# Patient Record
Sex: FEMALE | Race: WHITE | NOT HISPANIC OR LATINO
[De-identification: names, ages, dates, MRNs, and addresses within clinical notes are randomized per-mention and may not be internally consistent; named-entity substitution may affect disease eponyms.]

---

## 2020-01-10 ENCOUNTER — APPOINTMENT (OUTPATIENT)
Dept: COLORECTAL SURGERY | Facility: CLINIC | Age: 40
End: 2020-01-10
Payer: COMMERCIAL

## 2020-01-10 VITALS
DIASTOLIC BLOOD PRESSURE: 73 MMHG | HEART RATE: 70 BPM | BODY MASS INDEX: 23.03 KG/M2 | WEIGHT: 122 LBS | HEIGHT: 61 IN | SYSTOLIC BLOOD PRESSURE: 109 MMHG | RESPIRATION RATE: 14 BRPM

## 2020-01-10 PROCEDURE — 46940 TREATMENT OF ANAL FISSURE: CPT

## 2020-01-10 PROCEDURE — 99245 OFF/OP CONSLTJ NEW/EST HI 55: CPT | Mod: 25

## 2020-01-11 NOTE — ASSESSMENT
[FreeTextEntry1] : 39-year-old female with chronic anal fissure and hemorrhoids. Recommend nitroglycerin ointment b.i.d.recommend high fiber diet, metamucil daily, stool softners, sitzs baths, hydrocortisone cream and suppositories prn if symptoms didn't respond and surgery with fissurectomy sphincterotomy hemorrhoidectomy

## 2020-01-11 NOTE — HISTORY OF PRESENT ILLNESS
[FreeTextEntry1] : 39 yr old Female with complaints of anorectal pain and bleeding and has a past history of anal fissure and hemorrhoids symptoms have been worsening over the last several months

## 2020-01-11 NOTE — PHYSICAL EXAM
[Abdomen Masses] : No abdominal masses [Abdomen Tenderness] : ~T No ~M abdominal tenderness [Tender] : nontender [Posterior] : posteriorly [Excoriation] : no perianal excoriation [Manually Reducible] : a manually reducible (grade III) [Tender, Swollen] : tender, swollen [JVD] : no jugular venous distention  [Normal Breath Sounds] : Normal breath sounds [Normal Heart Sounds] : normal heart sounds [Normal Rate and Rhythm] : normal rate and rhythm [No Rash or Lesion] : No rash or lesion [Alert] : alert [Oriented to Person] : oriented to person [Oriented to Place] : oriented to place [Oriented to Time] : oriented to time [Calm] : calm [de-identified] : Looks well in no distress, of stated age. [de-identified] : pupils equal reactive to light normocephalic atraumatic. [de-identified] : moves all 4 extremities appropriately with 5 over 5 strength

## 2020-01-13 ENCOUNTER — APPOINTMENT (OUTPATIENT)
Dept: COLORECTAL SURGERY | Facility: CLINIC | Age: 40
End: 2020-01-13

## 2020-02-04 ENCOUNTER — OUTPATIENT (OUTPATIENT)
Dept: OUTPATIENT SERVICES | Facility: HOSPITAL | Age: 40
LOS: 1 days | End: 2020-02-04
Payer: COMMERCIAL

## 2020-02-04 VITALS
DIASTOLIC BLOOD PRESSURE: 74 MMHG | SYSTOLIC BLOOD PRESSURE: 118 MMHG | HEIGHT: 62 IN | WEIGHT: 123.02 LBS | TEMPERATURE: 98 F | OXYGEN SATURATION: 100 % | RESPIRATION RATE: 18 BRPM | HEART RATE: 64 BPM

## 2020-02-04 DIAGNOSIS — Z01.818 ENCOUNTER FOR OTHER PREPROCEDURAL EXAMINATION: ICD-10-CM

## 2020-02-04 DIAGNOSIS — K60.2 ANAL FISSURE, UNSPECIFIED: ICD-10-CM

## 2020-02-04 DIAGNOSIS — Z86.69 PERSONAL HISTORY OF OTHER DISEASES OF THE NERVOUS SYSTEM AND SENSE ORGANS: Chronic | ICD-10-CM

## 2020-02-04 LAB
ANION GAP SERPL CALC-SCNC: 3 MMOL/L — LOW (ref 5–17)
APTT BLD: 35.1 SEC — SIGNIFICANT CHANGE UP (ref 27.5–36.3)
BASOPHILS # BLD AUTO: 0.05 K/UL — SIGNIFICANT CHANGE UP (ref 0–0.2)
BASOPHILS NFR BLD AUTO: 0.7 % — SIGNIFICANT CHANGE UP (ref 0–2)
BUN SERPL-MCNC: 18 MG/DL — SIGNIFICANT CHANGE UP (ref 7–23)
CALCIUM SERPL-MCNC: 9 MG/DL — SIGNIFICANT CHANGE UP (ref 8.5–10.1)
CHLORIDE SERPL-SCNC: 111 MMOL/L — HIGH (ref 96–108)
CO2 SERPL-SCNC: 27 MMOL/L — SIGNIFICANT CHANGE UP (ref 22–31)
CREAT SERPL-MCNC: 0.76 MG/DL — SIGNIFICANT CHANGE UP (ref 0.5–1.3)
EOSINOPHIL # BLD AUTO: 0.12 K/UL — SIGNIFICANT CHANGE UP (ref 0–0.5)
EOSINOPHIL NFR BLD AUTO: 1.7 % — SIGNIFICANT CHANGE UP (ref 0–6)
GLUCOSE SERPL-MCNC: 75 MG/DL — SIGNIFICANT CHANGE UP (ref 70–99)
HCT VFR BLD CALC: 37.2 % — SIGNIFICANT CHANGE UP (ref 34.5–45)
HGB BLD-MCNC: 11.9 G/DL — SIGNIFICANT CHANGE UP (ref 11.5–15.5)
IMM GRANULOCYTES NFR BLD AUTO: 0.3 % — SIGNIFICANT CHANGE UP (ref 0–1.5)
INR BLD: 1.06 RATIO — SIGNIFICANT CHANGE UP (ref 0.88–1.16)
LYMPHOCYTES # BLD AUTO: 2.39 K/UL — SIGNIFICANT CHANGE UP (ref 1–3.3)
LYMPHOCYTES # BLD AUTO: 33.5 % — SIGNIFICANT CHANGE UP (ref 13–44)
MCHC RBC-ENTMCNC: 30.4 PG — SIGNIFICANT CHANGE UP (ref 27–34)
MCHC RBC-ENTMCNC: 32 GM/DL — SIGNIFICANT CHANGE UP (ref 32–36)
MCV RBC AUTO: 94.9 FL — SIGNIFICANT CHANGE UP (ref 80–100)
MONOCYTES # BLD AUTO: 0.48 K/UL — SIGNIFICANT CHANGE UP (ref 0–0.9)
MONOCYTES NFR BLD AUTO: 6.7 % — SIGNIFICANT CHANGE UP (ref 2–14)
NEUTROPHILS # BLD AUTO: 4.07 K/UL — SIGNIFICANT CHANGE UP (ref 1.8–7.4)
NEUTROPHILS NFR BLD AUTO: 57.1 % — SIGNIFICANT CHANGE UP (ref 43–77)
PLATELET # BLD AUTO: 295 K/UL — SIGNIFICANT CHANGE UP (ref 150–400)
POTASSIUM SERPL-MCNC: 4.3 MMOL/L — SIGNIFICANT CHANGE UP (ref 3.5–5.3)
POTASSIUM SERPL-SCNC: 4.3 MMOL/L — SIGNIFICANT CHANGE UP (ref 3.5–5.3)
PROTHROM AB SERPL-ACNC: 11.8 SEC — SIGNIFICANT CHANGE UP (ref 10–12.9)
RBC # BLD: 3.92 M/UL — SIGNIFICANT CHANGE UP (ref 3.8–5.2)
RBC # FLD: 14 % — SIGNIFICANT CHANGE UP (ref 10.3–14.5)
SODIUM SERPL-SCNC: 141 MMOL/L — SIGNIFICANT CHANGE UP (ref 135–145)
WBC # BLD: 7.13 K/UL — SIGNIFICANT CHANGE UP (ref 3.8–10.5)
WBC # FLD AUTO: 7.13 K/UL — SIGNIFICANT CHANGE UP (ref 3.8–10.5)

## 2020-02-04 PROCEDURE — 85730 THROMBOPLASTIN TIME PARTIAL: CPT

## 2020-02-04 PROCEDURE — 36415 COLL VENOUS BLD VENIPUNCTURE: CPT

## 2020-02-04 PROCEDURE — 85025 COMPLETE CBC W/AUTO DIFF WBC: CPT

## 2020-02-04 PROCEDURE — 85610 PROTHROMBIN TIME: CPT

## 2020-02-04 PROCEDURE — 93005 ELECTROCARDIOGRAM TRACING: CPT

## 2020-02-04 PROCEDURE — 71046 X-RAY EXAM CHEST 2 VIEWS: CPT

## 2020-02-04 PROCEDURE — 80048 BASIC METABOLIC PNL TOTAL CA: CPT

## 2020-02-04 PROCEDURE — G0463: CPT | Mod: 25

## 2020-02-04 PROCEDURE — 93010 ELECTROCARDIOGRAM REPORT: CPT

## 2020-02-04 PROCEDURE — 71046 X-RAY EXAM CHEST 2 VIEWS: CPT | Mod: 26

## 2020-02-04 NOTE — H&P PST ADULT - TEACHING/LEARNING LEARNING PREFERENCES
verbal instruction/individual instruction/pictorial/skill demonstration/group instruction/audio/computer/internet/video/written material

## 2020-02-04 NOTE — H&P PST ADULT - HISTORY OF PRESENT ILLNESS
39 y.o female presents for PST with hx of hem 39 y.o female presents for PST with hx of hemorrhoids. Patient states ever since her last pregnancy 16yrs ago she has had hemorrhoids. Her hx also significant for constipation. She has treated her symptoms conservatively over the years. She states her hemorrhoids have worsened, bleeding, painful. She has developed a fissure.  Patient followed with surgeon and opting for surgical intervention. Scheduled for Hemorrhoidectomy, Fissurectomy, Sphincterotomy

## 2020-02-04 NOTE — H&P PST ADULT - ASSESSMENT
39 y.o female scheduled for 39 y.o female scheduled for Hemorrhoidectomy, Fissurectomy, Sphincterotomy   Plan  1. Stop all NSAIDS, herbal supplements and vitamins for 7 days.  2. NPO at midnight.  3. Take the following medications Lexapro with small sips of water on the morning of your procedure/surgery.  4. Labs, EKG, CXR  as per surgeon  5. UCG STAT on admit to [facility]

## 2020-02-04 NOTE — H&P PST ADULT - NSICDXPASTMEDICALHX_GEN_ALL_CORE_FT
PAST MEDICAL HISTORY:  Anxiety     H/O chronic fatigue syndrome     H/O hemorrhoids     History of anal fissures     History of Chiari malformation     Lyme disease 2009- treated as a result chronic fatigue syndrome

## 2020-02-04 NOTE — H&P PST ADULT - NEUROLOGICAL COMMENTS
hx Chiari Malformation surgery 2011- no longer follows with neuro Dr [facility] at [facility]

## 2020-02-05 DIAGNOSIS — K60.2 ANAL FISSURE, UNSPECIFIED: ICD-10-CM

## 2020-02-05 DIAGNOSIS — Z01.818 ENCOUNTER FOR OTHER PREPROCEDURAL EXAMINATION: ICD-10-CM

## 2020-02-07 RX ORDER — SODIUM CHLORIDE 9 MG/ML
3 INJECTION INTRAMUSCULAR; INTRAVENOUS; SUBCUTANEOUS EVERY 8 HOURS
Refills: 0 | Status: DISCONTINUED | OUTPATIENT
Start: 2020-02-13 | End: 2020-02-13

## 2020-02-12 RX ORDER — FENTANYL CITRATE 50 UG/ML
50 INJECTION INTRAVENOUS
Refills: 0 | Status: DISCONTINUED | OUTPATIENT
Start: 2020-02-13 | End: 2020-02-13

## 2020-02-12 RX ORDER — ONDANSETRON 8 MG/1
4 TABLET, FILM COATED ORAL ONCE
Refills: 0 | Status: DISCONTINUED | OUTPATIENT
Start: 2020-02-13 | End: 2020-02-13

## 2020-02-12 RX ORDER — OXYCODONE HYDROCHLORIDE 5 MG/1
5 TABLET ORAL ONCE
Refills: 0 | Status: DISCONTINUED | OUTPATIENT
Start: 2020-02-13 | End: 2020-02-13

## 2020-02-12 RX ORDER — SODIUM CHLORIDE 9 MG/ML
1000 INJECTION, SOLUTION INTRAVENOUS
Refills: 0 | Status: DISCONTINUED | OUTPATIENT
Start: 2020-02-13 | End: 2020-02-13

## 2020-02-13 ENCOUNTER — APPOINTMENT (OUTPATIENT)
Dept: COLORECTAL SURGERY | Facility: HOSPITAL | Age: 40
End: 2020-02-13
Payer: COMMERCIAL

## 2020-02-13 ENCOUNTER — OUTPATIENT (OUTPATIENT)
Dept: INPATIENT UNIT | Facility: HOSPITAL | Age: 40
LOS: 1 days | Discharge: HOME | End: 2020-02-13
Payer: COMMERCIAL

## 2020-02-13 ENCOUNTER — RESULT REVIEW (OUTPATIENT)
Age: 40
End: 2020-02-13

## 2020-02-13 VITALS
TEMPERATURE: 98 F | HEIGHT: 62 IN | WEIGHT: 123.02 LBS | OXYGEN SATURATION: 100 % | DIASTOLIC BLOOD PRESSURE: 63 MMHG | SYSTOLIC BLOOD PRESSURE: 104 MMHG | RESPIRATION RATE: 16 BRPM | HEART RATE: 71 BPM

## 2020-02-13 VITALS
TEMPERATURE: 98 F | RESPIRATION RATE: 14 BRPM | SYSTOLIC BLOOD PRESSURE: 100 MMHG | HEART RATE: 82 BPM | DIASTOLIC BLOOD PRESSURE: 61 MMHG | OXYGEN SATURATION: 100 %

## 2020-02-13 DIAGNOSIS — K64.4 RESIDUAL HEMORRHOIDAL SKIN TAGS: ICD-10-CM

## 2020-02-13 DIAGNOSIS — Z86.69 PERSONAL HISTORY OF OTHER DISEASES OF THE NERVOUS SYSTEM AND SENSE ORGANS: Chronic | ICD-10-CM

## 2020-02-13 DIAGNOSIS — K60.2 ANAL FISSURE, UNSPECIFIED: ICD-10-CM

## 2020-02-13 LAB — HCG UR QL: NEGATIVE — SIGNIFICANT CHANGE UP

## 2020-02-13 PROCEDURE — 46200 REMOVAL OF ANAL FISSURE: CPT | Mod: 59

## 2020-02-13 PROCEDURE — C9290: CPT

## 2020-02-13 PROCEDURE — 46260 REMOVE IN/EX HEM GROUPS 2+: CPT

## 2020-02-13 PROCEDURE — 81025 URINE PREGNANCY TEST: CPT

## 2020-02-13 PROCEDURE — 88304 TISSUE EXAM BY PATHOLOGIST: CPT | Mod: 26

## 2020-02-13 PROCEDURE — 88304 TISSUE EXAM BY PATHOLOGIST: CPT

## 2020-02-13 RX ORDER — OXYCODONE AND ACETAMINOPHEN 5; 325 MG/1; MG/1
1 TABLET ORAL EVERY 4 HOURS
Refills: 0 | Status: DISCONTINUED | OUTPATIENT
Start: 2020-02-13 | End: 2020-02-13

## 2020-02-13 RX ORDER — ESCITALOPRAM OXALATE 10 MG/1
1 TABLET, FILM COATED ORAL
Qty: 0 | Refills: 0 | DISCHARGE

## 2020-02-13 RX ORDER — ONDANSETRON 8 MG/1
4 TABLET, FILM COATED ORAL EVERY 6 HOURS
Refills: 0 | Status: DISCONTINUED | OUTPATIENT
Start: 2020-02-13 | End: 2020-02-13

## 2020-02-13 RX ORDER — KETOROLAC TROMETHAMINE 30 MG/ML
1 SYRINGE (ML) INJECTION
Qty: 20 | Refills: 0
Start: 2020-02-13 | End: 2020-02-17

## 2020-02-13 RX ORDER — CHOLECALCIFEROL (VITAMIN D3) 125 MCG
0 CAPSULE ORAL
Qty: 0 | Refills: 0 | DISCHARGE

## 2020-02-13 NOTE — ASU DISCHARGE PLAN (ADULT/PEDIATRIC) - CARE PROVIDER_API CALL
Tenzin Moulton)  ColonRectal Surgery; Surgery  [address] [place] [address]  Phone: [phone]  Fax: [phone]  Follow Up Time:

## 2020-02-13 NOTE — ASU PATIENT PROFILE, ADULT - PMH
Anxiety    H/O chronic fatigue syndrome    H/O hemorrhoids    History of anal fissures    History of Chiari malformation    Lyme disease  2009- treated as a result chronic fatigue syndrome

## 2020-02-13 NOTE — BRIEF OPERATIVE NOTE - NSICDXBRIEFPREOP_GEN_ALL_CORE_FT
PRE-OP DIAGNOSIS:  Hemorrhoids 13-Feb-2020 08:04:18  Gabriele Mackey  Anal fissure 13-Feb-2020 08:04:03  Gabriele Mackey

## 2020-02-13 NOTE — BRIEF OPERATIVE NOTE - NSICDXBRIEFPROCEDURE_GEN_ALL_CORE_FT
PROCEDURES:  Hemorrhoidectomy, internal and external, with fissurectomy, 1 anal column 13-Feb-2020 08:03:55  Gabriele Mackey  Anal fissurectomy including sphincterotomy 13-Feb-2020 08:03:43  Gabriele Mackey

## 2020-02-13 NOTE — ASU DISCHARGE PLAN (ADULT/PEDIATRIC) - CALL YOUR DOCTOR IF YOU HAVE ANY OF THE FOLLOWING:
Pain not relieved by Medications/Excessive diarrhea/Nausea and vomiting that does not stop/Bleeding that does not stop

## 2020-02-14 PROBLEM — Z86.69 PERSONAL HISTORY OF OTHER DISEASES OF THE NERVOUS SYSTEM AND SENSE ORGANS: Chronic | Status: ACTIVE | Noted: 2020-02-04

## 2020-02-14 PROBLEM — A69.20 LYME DISEASE, UNSPECIFIED: Chronic | Status: ACTIVE | Noted: 2020-02-04

## 2020-02-14 PROBLEM — F41.9 ANXIETY DISORDER, UNSPECIFIED: Chronic | Status: ACTIVE | Noted: 2020-02-04

## 2020-02-14 PROBLEM — Z87.19 PERSONAL HISTORY OF OTHER DISEASES OF THE DIGESTIVE SYSTEM: Chronic | Status: ACTIVE | Noted: 2020-02-04

## 2020-02-14 PROBLEM — Z87.898 PERSONAL HISTORY OF OTHER SPECIFIED CONDITIONS: Chronic | Status: ACTIVE | Noted: 2020-02-04

## 2020-02-18 DIAGNOSIS — K64.8 OTHER HEMORRHOIDS: ICD-10-CM

## 2020-02-18 DIAGNOSIS — Z87.891 PERSONAL HISTORY OF NICOTINE DEPENDENCE: ICD-10-CM

## 2020-02-18 DIAGNOSIS — K64.4 RESIDUAL HEMORRHOIDAL SKIN TAGS: ICD-10-CM

## 2020-02-18 DIAGNOSIS — K60.1 CHRONIC ANAL FISSURE: ICD-10-CM

## 2020-02-18 DIAGNOSIS — Z88.5 ALLERGY STATUS TO NARCOTIC AGENT: ICD-10-CM

## 2020-02-18 DIAGNOSIS — F41.9 ANXIETY DISORDER, UNSPECIFIED: ICD-10-CM

## 2020-03-12 ENCOUNTER — APPOINTMENT (OUTPATIENT)
Dept: COLORECTAL SURGERY | Facility: CLINIC | Age: 40
End: 2020-03-12
Payer: COMMERCIAL

## 2020-03-12 VITALS
WEIGHT: 122 LBS | HEART RATE: 72 BPM | BODY MASS INDEX: 23.03 KG/M2 | RESPIRATION RATE: 14 BRPM | DIASTOLIC BLOOD PRESSURE: 72 MMHG | SYSTOLIC BLOOD PRESSURE: 104 MMHG | HEIGHT: 61 IN | TEMPERATURE: 97.9 F

## 2020-03-12 DIAGNOSIS — K60.2 ANAL FISSURE, UNSPECIFIED: ICD-10-CM

## 2020-03-12 PROCEDURE — 99024 POSTOP FOLLOW-UP VISIT: CPT

## 2020-03-12 NOTE — PHYSICAL EXAM
[Normal rectal exam] : exam was normal [Excoriation] : no perianal excoriation [Tender, Swollen] : nontender, non-swollen [Normal] : was normal [None] : there was no rectal mass  [de-identified] : Looks well in no distress, of stated age.

## 2020-03-12 NOTE — ASSESSMENT
[FreeTextEntry1] : 39-year-old female post hemorrhoidectomy fisure surgery who fell at work today and landed on her backside. There is no indication of any trauma to the surgical area she is healing nicely.

## 2020-03-12 NOTE — HISTORY OF PRESENT ILLNESS
[FreeTextEntry1] : 39-year-old female postop fissure and hemorrhoid surgery she unfortunately fell at work today and landed on her backside. Complains of shortness at the tailbone region. Otherwise no major complaints

## 2020-05-05 ENCOUNTER — APPOINTMENT (OUTPATIENT)
Dept: COLORECTAL SURGERY | Facility: CLINIC | Age: 40
End: 2020-05-05

## 2020-10-20 ENCOUNTER — APPOINTMENT (OUTPATIENT)
Dept: COLORECTAL SURGERY | Facility: CLINIC | Age: 40
End: 2020-10-20
Payer: COMMERCIAL

## 2020-10-20 DIAGNOSIS — K21.9 GASTRO-ESOPHAGEAL REFLUX DISEASE W/OUT ESOPHAGITIS: ICD-10-CM

## 2020-10-20 DIAGNOSIS — K64.9 UNSPECIFIED HEMORRHOIDS: ICD-10-CM

## 2020-10-20 DIAGNOSIS — R10.13 EPIGASTRIC PAIN: ICD-10-CM

## 2020-10-20 PROCEDURE — 99214 OFFICE O/P EST MOD 30 MIN: CPT | Mod: 25

## 2020-10-20 PROCEDURE — 46600 DIAGNOSTIC ANOSCOPY SPX: CPT

## 2020-10-21 PROBLEM — K21.9 GERD (GASTROESOPHAGEAL REFLUX DISEASE): Status: ACTIVE | Noted: 2020-10-21

## 2020-10-21 PROBLEM — R10.13 DYSPEPSIA: Status: ACTIVE | Noted: 2020-10-21

## 2020-10-21 NOTE — ASSESSMENT
[FreeTextEntry1] : 40-year-old female post hemorrhoidectomy fisure surgery who fell at work today and landed on her backside. There is no indication of any trauma to the surgical area she is healing nicely.

## 2020-10-21 NOTE — REVIEW OF SYSTEMS
[As Noted in HPI] : as noted in HPI [Abdominal Pain] : abdominal pain [Heartburn] : heartburn [Negative] : Heme/Lymph [FreeTextEntry7] : dyspepsia

## 2020-10-21 NOTE — HISTORY OF PRESENT ILLNESS
[FreeTextEntry1] : 40 year-old female with  hemorrhoids with new complaints of dyspepsia and GERD for several months, symptoms have been worsening

## 2020-10-21 NOTE — PHYSICAL EXAM
[Normal rectal exam] : exam was normal [Excoriation] : no perianal excoriation [Nonprolapsing] : a nonprolapsing (grade I) [Tender, Swollen] : tender, swollen [Normal] : was normal [None] : there was no rectal mass  [JVD] : no jugular venous distention  [Normal Breath Sounds] : Normal breath sounds [Normal Heart Sounds] : normal heart sounds [Normal Rate and Rhythm] : normal rate and rhythm [No Rash or Lesion] : No rash or lesion [Alert] : alert [Oriented to Person] : oriented to person [Oriented to Place] : oriented to place [Oriented to Time] : oriented to time [Calm] : calm [de-identified] : Looks well in no distress, of stated age.

## 2022-09-19 NOTE — ASU PATIENT PROFILE, ADULT - TEACHING/LEARNING LEARNING PREFERENCES
Render In Strict Bullet Format?: Yes Detail Level: Zone Continue Regimen: TAC 0.1 % cream: BID prn up to 2 weeks/month on any given area\\nCetaphil or CeraVe cream twice daily ongoing Plan: Involvement on intertriginous areas x years\\nImprovement with current regimen\\nPatient/mother decline therapy change at this time\\nRefills available at pharmacy\\n\\nRTC in 1 year for refills, sooner for flares/therapy change video/written material/skill demonstration/individual instruction/pictorial/audio/computer/internet/group instruction/verbal instruction

## 2023-04-04 ENCOUNTER — APPOINTMENT (OUTPATIENT)
Dept: COLORECTAL SURGERY | Facility: CLINIC | Age: 43
End: 2023-04-04
Payer: COMMERCIAL

## 2023-04-04 VITALS
RESPIRATION RATE: 14 BRPM | BODY MASS INDEX: 28.32 KG/M2 | HEART RATE: 76 BPM | OXYGEN SATURATION: 99 % | WEIGHT: 150 LBS | HEIGHT: 61 IN | SYSTOLIC BLOOD PRESSURE: 107 MMHG | TEMPERATURE: 97.6 F | DIASTOLIC BLOOD PRESSURE: 75 MMHG

## 2023-04-04 DIAGNOSIS — M79.18 MYALGIA, OTHER SITE: ICD-10-CM

## 2023-04-04 DIAGNOSIS — K56.1 INTUSSUSCEPTION: ICD-10-CM

## 2023-04-04 PROCEDURE — 46600 DIAGNOSTIC ANOSCOPY SPX: CPT

## 2023-04-04 PROCEDURE — 99214 OFFICE O/P EST MOD 30 MIN: CPT | Mod: 25

## 2023-04-04 RX ORDER — HYDROCORTISONE 100 MG/60ML
100 ENEMA RECTAL
Qty: 14 | Refills: 1 | Status: ACTIVE | COMMUNITY
Start: 2023-04-04 | End: 1900-01-01

## 2023-04-04 RX ORDER — DIAZEPAM 100 %
POWDER (GRAM) MISCELLANEOUS
Qty: 50 | Refills: 0 | Status: ACTIVE | COMMUNITY
Start: 2023-04-04 | End: 1900-01-01

## 2023-04-04 NOTE — HISTORY OF PRESENT ILLNESS
[FreeTextEntry1] : 40 year-old female with  hemorrhoids with new complaints of dyspepsia and GERD for several months, symptoms have been worsening\par \par 4/4/23 Tessy returns to the office for follow-up.  She recently developed significant constipation associated with Saxenda.  Secondary to this constipation, she strained to have a bowel movement with subsequent blowout.  Since that time, she feels constant pressure within her rectum as if she needs to defecate and she also is having significant pelvic pain that also radiates to her front.  Since the medication was discontinued, she does not have any issues passing stools whether they are somewhat hard for soft.  No reports of rectal bleeding.

## 2023-04-04 NOTE — PHYSICAL EXAM
[Normal rectal exam] : exam was normal [Anterior] : anteriorly [Posterior] : posteriorly [Excoriation] : no perianal excoriation [Reduce Spontaneously] : a spontaneously reducible (grade II) [Normal] : was normal [None] : there was no rectal mass  [JVD] : no jugular venous distention  [Normal Breath Sounds] : Normal breath sounds [Normal Heart Sounds] : normal heart sounds [Normal Rate and Rhythm] : normal rate and rhythm [No Rash or Lesion] : No rash or lesion [Alert] : alert [Oriented to Person] : oriented to person [Oriented to Place] : oriented to place [Oriented to Time] : oriented to time [Calm] : calm [de-identified] : Right posterior and left anterior asymptomatic anal fissures [de-identified] : Circumferential distal rectal mucosa inflammation [de-identified] : Looks well in no distress, of stated age.

## 2023-10-10 ENCOUNTER — APPOINTMENT (OUTPATIENT)
Dept: ORTHOPEDIC SURGERY | Facility: CLINIC | Age: 43
End: 2023-10-10
Payer: COMMERCIAL

## 2023-10-10 PROCEDURE — 28400 CLTX CALCANEAL FX W/O MNPJ: CPT | Mod: RT

## 2023-10-10 PROCEDURE — L4361: CPT | Mod: RT

## 2023-10-10 PROCEDURE — 28450 TX TARSAL B1 FX W/O MNPJ EA: CPT | Mod: LT

## 2023-10-10 PROCEDURE — 99203 OFFICE O/P NEW LOW 30 MIN: CPT | Mod: 25

## 2023-10-10 PROCEDURE — 27786 TREATMENT OF ANKLE FRACTURE: CPT | Mod: RT

## 2023-10-13 ENCOUNTER — RESULT REVIEW (OUTPATIENT)
Age: 43
End: 2023-10-13

## 2023-10-16 ENCOUNTER — APPOINTMENT (OUTPATIENT)
Dept: ORTHOPEDIC SURGERY | Facility: CLINIC | Age: 43
End: 2023-10-16
Payer: COMMERCIAL

## 2023-10-16 PROCEDURE — 99024 POSTOP FOLLOW-UP VISIT: CPT

## 2023-11-30 ENCOUNTER — APPOINTMENT (OUTPATIENT)
Dept: ORTHOPEDIC SURGERY | Facility: CLINIC | Age: 43
End: 2023-11-30
Payer: COMMERCIAL

## 2023-11-30 ENCOUNTER — TRANSCRIPTION ENCOUNTER (OUTPATIENT)
Age: 43
End: 2023-11-30

## 2023-11-30 DIAGNOSIS — S92.355A NONDISPLACED FRACTURE OF FIFTH METATARSAL BONE, LEFT FOOT, INITIAL ENCOUNTER FOR CLOSED FRACTURE: ICD-10-CM

## 2023-11-30 DIAGNOSIS — S90.32XA CONTUSION OF LEFT FOOT, INITIAL ENCOUNTER: ICD-10-CM

## 2023-11-30 DIAGNOSIS — S90.31XA CONTUSION OF RIGHT FOOT, INITIAL ENCOUNTER: ICD-10-CM

## 2023-11-30 DIAGNOSIS — S90.02XA CONTUSION OF LEFT ANKLE, INITIAL ENCOUNTER: ICD-10-CM

## 2023-11-30 DIAGNOSIS — S90.01XA CONTUSION OF RIGHT ANKLE, INITIAL ENCOUNTER: ICD-10-CM

## 2023-11-30 DIAGNOSIS — S92.021P: ICD-10-CM

## 2023-11-30 PROCEDURE — 73630 X-RAY EXAM OF FOOT: CPT | Mod: RT

## 2023-11-30 PROCEDURE — 99024 POSTOP FOLLOW-UP VISIT: CPT

## 2024-01-02 ENCOUNTER — APPOINTMENT (OUTPATIENT)
Dept: ORTHOPEDIC SURGERY | Facility: CLINIC | Age: 44
End: 2024-01-02
Payer: COMMERCIAL

## 2024-01-02 PROCEDURE — 99024 POSTOP FOLLOW-UP VISIT: CPT

## 2024-01-02 PROCEDURE — 73630 X-RAY EXAM OF FOOT: CPT | Mod: RT

## 2024-01-02 NOTE — PHYSICAL EXAM
[2+] : posterior tibialis pulse: 2+ [Normal] : saphenous nerve sensation normal [Moderate] : moderate swelling of medial foot [NL 30)] : inversion 30 degrees [NL (40)] : MTP joint DF 40 degrees [NL (20)] : MTP joint PF 20 degrees [5___] : Count includes the [facility] 5[unfilled]/5 [The fracture is in acceptable alignment. There is progression in healing seen] : The fracture is in acceptable alignment. There is progression in healing seen [FreeTextEntry9] : Lateral mall avulsion.  [de-identified] : Anterior process fracture Calcaneal  Fifth met fracture right foot,  [Right] : right foot and ankle [Mild] : mild diffused ankle swelling [5th] : 5th [4___] : plantar flexion 4[unfilled]/5 [] : patient ambulates without assistive device [de-identified] : 5th metatarsal diaphyseal fracture with continued visualization of fracture line less than 1cm gap

## 2024-01-02 NOTE — DISCUSSION/SUMMARY
[de-identified] : I reviewed patient's radiographs and discussed her condition and treatment options.  I advised starting bone stimulator to enhance healing at the fracture site.  In the interim, I advised discontinuing CAM boot but wearing hard sole shoe.  Follow up in 2 weeks to  bone stimulator.  In the interim, I advised taking calcium and vitamin D supplements (patient is early menopausal and vegetarian).  Patient voiced understanding and agreement with the plan.

## 2024-01-02 NOTE — HISTORY OF PRESENT ILLNESS
[de-identified] : Patient of Dr. Reaves.  Since last visit, patient states that she has been out of the cam boot and walking on her own for the past week. 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  She is concerned about persistent right ankle and foot pain.
complains of pain/discomfort

## 2024-01-16 ENCOUNTER — APPOINTMENT (OUTPATIENT)
Dept: ORTHOPEDIC SURGERY | Facility: CLINIC | Age: 44
End: 2024-01-16

## 2024-02-06 ENCOUNTER — APPOINTMENT (OUTPATIENT)
Dept: ORTHOPEDIC SURGERY | Facility: CLINIC | Age: 44
End: 2024-02-06
Payer: COMMERCIAL

## 2024-02-06 PROCEDURE — 99213 OFFICE O/P EST LOW 20 MIN: CPT

## 2024-02-06 PROCEDURE — 73630 X-RAY EXAM OF FOOT: CPT | Mod: RT

## 2024-02-06 NOTE — DISCUSSION/SUMMARY
[de-identified] : I reviewed patient's radiographs and discussed her condition and treatment options.  Despite bone stimulator denial, I advised starting PT and HEP.   Follow up in 4-6 weeks XRs.  Patient voiced understanding and agreement with the plan.

## 2024-02-06 NOTE — HISTORY OF PRESENT ILLNESS
[de-identified] : Since last visit, patient states that she is still having pain. Patient's insurance denied bone stimulator which is frustrating, given continued pain and slow healing.

## 2024-02-06 NOTE — PHYSICAL EXAM
[Mild] : mild swelling of lateral foot [5th] : 5th [NL (40)] : plantar flexion 40 degrees [NL 30)] : inversion 30 degrees [NL (20)] : eversion 20 degrees [4___] : plantar flexion 4[unfilled]/5 [2+] : posterior tibialis pulse: 2+ [Normal] : saphenous nerve sensation normal [] : patient ambulates without assistive device [Right] : right foot [The fracture is in acceptable alignment. There is progression in healing seen] : The fracture is in acceptable alignment. There is progression in healing seen [de-identified] : 5th metatarsal diaphyseal fracture with continued visualization of fracture line less than 1cm gap

## 2024-03-05 ENCOUNTER — APPOINTMENT (OUTPATIENT)
Dept: ORTHOPEDIC SURGERY | Facility: CLINIC | Age: 44
End: 2024-03-05
Payer: COMMERCIAL

## 2024-03-05 PROCEDURE — 99213 OFFICE O/P EST LOW 20 MIN: CPT

## 2024-03-05 PROCEDURE — 73630 X-RAY EXAM OF FOOT: CPT | Mod: RT

## 2024-03-05 NOTE — DISCUSSION/SUMMARY
[de-identified] : I reviewed patient's radiographs and discussed her condition and treatment course with patient and bone stimulator representative.  Continue daily use of bone stimulator.  Follow up in 4-6 weeks XRs.  Patient voiced understanding and agreement with the plan.

## 2024-03-05 NOTE — HISTORY OF PRESENT ILLNESS
[de-identified] : Since last visit, patient states significant improvement in pain.  She has been using bone stimulator daily for about a month now.

## 2024-03-05 NOTE — PHYSICAL EXAM
[Mild] : mild diffused ankle swelling [5th] : 5th [NL (40)] : plantar flexion 40 degrees [NL 30)] : inversion 30 degrees [NL (20)] : eversion 20 degrees [4___] : plantar flexion 4[unfilled]/5 [2+] : posterior tibialis pulse: 2+ [Normal] : saphenous nerve sensation normal [Right] : right foot [The fracture is in acceptable alignment. There is progression in healing seen] : The fracture is in acceptable alignment. There is progression in healing seen [] : no metatarsal tenderness [de-identified] : 5th metatarsal diaphyseal fracture with interval healing

## 2024-04-02 ENCOUNTER — APPOINTMENT (OUTPATIENT)
Dept: ORTHOPEDIC SURGERY | Facility: CLINIC | Age: 44
End: 2024-04-02
Payer: COMMERCIAL

## 2024-04-02 PROCEDURE — 99213 OFFICE O/P EST LOW 20 MIN: CPT

## 2024-04-02 NOTE — HISTORY OF PRESENT ILLNESS
[de-identified] : Since last visit, patient states she has been using bone stim less due to family matters. Patient states she has good and bad days. Patient is hoping for improvement with bone healing.

## 2024-04-02 NOTE — PHYSICAL EXAM
[Mild] : mild diffused ankle swelling [5th] : 5th [NL (40)] : plantar flexion 40 degrees [NL 30)] : inversion 30 degrees [NL (20)] : eversion 20 degrees [4___] : plantar flexion 4[unfilled]/5 [2+] : posterior tibialis pulse: 2+ [Normal] : sural nerve sensation normal [Right] : right foot [The fracture is in acceptable alignment. There is progression in healing seen] : The fracture is in acceptable alignment. There is progression in healing seen [] : no metatarsal tenderness [de-identified] : 5th metatarsal diaphyseal fracture with about 90% bony union

## 2024-04-02 NOTE — DISCUSSION/SUMMARY
[de-identified] : I reviewed patient's radiographs and discussed her condition and treatment course.  Resume activities as tolerated.  Follow up in 4 weeks with anticipation of return to walking a mile without issue.  Patient voiced understanding and agreement with the plan.

## 2024-04-30 ENCOUNTER — APPOINTMENT (OUTPATIENT)
Dept: ORTHOPEDIC SURGERY | Facility: CLINIC | Age: 44
End: 2024-04-30
Payer: COMMERCIAL

## 2024-04-30 PROCEDURE — 99213 OFFICE O/P EST LOW 20 MIN: CPT

## 2024-04-30 NOTE — DISCUSSION/SUMMARY
[de-identified] : I discussed her condition and treatment course.  Continue HEP and start aerobic activities including elliptical machine.  Follow up in 6 weeks XRs.  Patient voiced understanding and agreement with the plan.

## 2024-04-30 NOTE — HISTORY OF PRESENT ILLNESS
[de-identified] : Since last visit, patient states she has been using bone stimulator but not as much. States she is doing well. Still having some discomfort in the foot but able to walk her dog and wear regular shoes.

## 2024-04-30 NOTE — PHYSICAL EXAM
[Mild] : mild diffused ankle swelling [5th] : 5th [NL (40)] : plantar flexion 40 degrees [NL 30)] : inversion 30 degrees [NL (20)] : eversion 20 degrees [4___] : plantar flexion 4[unfilled]/5 [2+] : posterior tibialis pulse: 2+ [Normal] : saphenous nerve sensation normal [Right] : right foot [The fracture is in acceptable alignment. There is progression in healing seen] : The fracture is in acceptable alignment. There is progression in healing seen [de-identified] : 5th metatarsal diaphyseal fracture with about 90% bony union [] : no metatarsal tenderness

## 2024-06-18 ENCOUNTER — APPOINTMENT (OUTPATIENT)
Dept: ORTHOPEDIC SURGERY | Facility: CLINIC | Age: 44
End: 2024-06-18
Payer: COMMERCIAL

## 2024-06-18 DIAGNOSIS — S92.354K NONDISPLACED FRACTURE OF FIFTH METATARSAL BONE, RIGHT FOOT, SUBSEQUENT ENCOUNTER FOR FRACTURE WITH NONUNION: ICD-10-CM

## 2024-06-18 PROCEDURE — 99213 OFFICE O/P EST LOW 20 MIN: CPT

## 2024-06-18 PROCEDURE — 73630 X-RAY EXAM OF FOOT: CPT | Mod: RT

## 2024-06-18 NOTE — HISTORY OF PRESENT ILLNESS
[de-identified] :  Since last visit, patient states improvement but is still having some aching at the end of the day.

## 2024-06-18 NOTE — DISCUSSION/SUMMARY
[de-identified] : I reviewed patient's radiographs and discussed her condition and treatment course.  Resume activities as tolerated.  Follow up as needed.  Patient voiced understanding and agreement with the plan.

## 2024-06-18 NOTE — PHYSICAL EXAM
[5th] : 5th [NL (40)] : plantar flexion 40 degrees [NL 30)] : inversion 30 degrees [NL (20)] : eversion 20 degrees [2+] : posterior tibialis pulse: 2+ [Normal] : saphenous nerve sensation normal [] : no metatarsal tenderness [5___] : plantar flexion 5[unfilled]/5 [Right] : right foot [de-identified] : healed 5th metatarsal fracture